# Patient Record
Sex: FEMALE | Race: WHITE | NOT HISPANIC OR LATINO
[De-identification: names, ages, dates, MRNs, and addresses within clinical notes are randomized per-mention and may not be internally consistent; named-entity substitution may affect disease eponyms.]

---

## 2017-04-03 ENCOUNTER — RX RENEWAL (OUTPATIENT)
Age: 67
End: 2017-04-03

## 2017-04-03 ENCOUNTER — APPOINTMENT (OUTPATIENT)
Dept: OBGYN | Facility: CLINIC | Age: 67
End: 2017-04-03

## 2017-04-03 VITALS
HEIGHT: 66 IN | WEIGHT: 211.13 LBS | SYSTOLIC BLOOD PRESSURE: 160 MMHG | DIASTOLIC BLOOD PRESSURE: 80 MMHG | BODY MASS INDEX: 33.93 KG/M2

## 2017-04-03 LAB
DATE COLLECTED: NORMAL
HEMOCCULT SP1 STL QL: NEGATIVE
QUALITY CONTROL: YES

## 2017-04-12 LAB — CYTOLOGY CVX/VAG DOC THIN PREP: NORMAL

## 2018-04-04 ENCOUNTER — APPOINTMENT (OUTPATIENT)
Dept: OBGYN | Facility: CLINIC | Age: 68
End: 2018-04-04
Payer: MEDICARE

## 2018-04-04 VITALS
WEIGHT: 215 LBS | BODY MASS INDEX: 33.35 KG/M2 | HEIGHT: 67.5 IN | DIASTOLIC BLOOD PRESSURE: 79 MMHG | SYSTOLIC BLOOD PRESSURE: 128 MMHG

## 2018-04-04 DIAGNOSIS — N76.2 ACUTE VULVITIS: ICD-10-CM

## 2018-04-04 LAB
DATE COLLECTED: NORMAL
HEMOCCULT SP1 STL QL: NEGATIVE
QUALITY CONTROL: YES

## 2018-04-04 PROCEDURE — 99214 OFFICE O/P EST MOD 30 MIN: CPT

## 2018-04-04 PROCEDURE — 82270 OCCULT BLOOD FECES: CPT

## 2018-04-09 LAB
CYTOLOGY CVX/VAG DOC THIN PREP: NORMAL
HPV HIGH+LOW RISK DNA PNL CVX: NOT DETECTED

## 2019-04-09 ENCOUNTER — APPOINTMENT (OUTPATIENT)
Dept: OBGYN | Facility: CLINIC | Age: 69
End: 2019-04-09
Payer: MEDICARE

## 2019-04-09 VITALS
DIASTOLIC BLOOD PRESSURE: 80 MMHG | WEIGHT: 211 LBS | HEIGHT: 67 IN | BODY MASS INDEX: 33.12 KG/M2 | SYSTOLIC BLOOD PRESSURE: 146 MMHG

## 2019-04-09 DIAGNOSIS — Z85.828 PERSONAL HISTORY OF OTHER MALIGNANT NEOPLASM OF SKIN: ICD-10-CM

## 2019-04-09 LAB
DATE COLLECTED: NORMAL
HEMOCCULT SP1 STL QL: NEGATIVE
QUALITY CONTROL: YES

## 2019-04-09 PROCEDURE — 82270 OCCULT BLOOD FECES: CPT

## 2019-04-09 PROCEDURE — G0101: CPT

## 2019-04-09 NOTE — REVIEW OF SYSTEMS
[Sight Problems] : sight problems [SOB on Exertion] : shortness of breath during exertion [Nl] : Integumentary

## 2019-04-09 NOTE — PHYSICAL EXAM
[Awake] : awake [Alert] : alert [Soft] : soft [Oriented x3] : oriented to person, place, and time [Normal] : urethra [Labia Majora] : labia major [Labia Minora] : labia minora [Atrophy] : atrophy [No Bleeding] : there was no active vaginal bleeding [Absent] : absent [Adnexa Absent] : absent bilaterally [No Tenderness] : no rectal tenderness [Acute Distress] : no acute distress [Thyroid Nodule] : no thyroid nodule [LAD] : no lymphadenopathy [Goiter] : no goiter [Mass] : no breast mass [Axillary LAD] : no axillary lymphadenopathy [Nipple Discharge] : no nipple discharge [Tender] : non tender [Distended] : not distended [H/Smegaly] : no hepatosplenomegaly [Depressed Mood] : not depressed [Adnexa Tenderness] : were not tender [Flat Affect] : affect not flat [Ovarian Mass (___ Cm)] : there were no adnexal masses [Occult Blood] : occult blood test from digital rectal exam was negative [de-identified] : breast exam: supine and upright    abd. hernia

## 2019-04-22 LAB
CYTOLOGY CVX/VAG DOC THIN PREP: NORMAL
HPV HIGH+LOW RISK DNA PNL CVX: NOT DETECTED

## 2020-04-15 ENCOUNTER — APPOINTMENT (OUTPATIENT)
Dept: OBGYN | Facility: CLINIC | Age: 70
End: 2020-04-15

## 2020-06-11 ENCOUNTER — APPOINTMENT (OUTPATIENT)
Dept: OBGYN | Facility: CLINIC | Age: 70
End: 2020-06-11
Payer: MEDICARE

## 2020-06-11 VITALS
HEIGHT: 67 IN | DIASTOLIC BLOOD PRESSURE: 70 MMHG | BODY MASS INDEX: 33.59 KG/M2 | SYSTOLIC BLOOD PRESSURE: 130 MMHG | WEIGHT: 214 LBS

## 2020-06-11 LAB
DATE COLLECTED: NORMAL
HEMOCCULT SP1 STL QL: NEGATIVE
QUALITY CONTROL: YES

## 2020-06-11 PROCEDURE — 82270 OCCULT BLOOD FECES: CPT

## 2020-06-11 PROCEDURE — 99214 OFFICE O/P EST MOD 30 MIN: CPT

## 2020-06-11 NOTE — PHYSICAL EXAM
[Awake] : awake [Alert] : alert [Soft] : soft [Oriented x3] : oriented to person, place, and time [Labia Majora] : labia major [Normal] : urethra [Labia Minora] : labia minora [Atrophy] : atrophy [No Bleeding] : there was no active vaginal bleeding [Absent] : was absent [Adnexa Absent] : absent bilaterally [No Tenderness] : no rectal tenderness [Acute Distress] : no acute distress [LAD] : no lymphadenopathy [Thyroid Nodule] : no thyroid nodule [Goiter] : no goiter [Mass] : no breast mass [Nipple Discharge] : no nipple discharge [Axillary LAD] : no axillary lymphadenopathy [Tender] : non tender [Distended] : not distended [H/Smegaly] : no hepatosplenomegaly [Adnexa Tenderness] : were not tender [Flat Affect] : affect not flat [Depressed Mood] : not depressed [Occult Blood] : occult blood test from digital rectal exam was negative [Ovarian Mass (___ Cm)] : there were no adnexal masses [de-identified] : breast exam: supine and upright    abd. hernia [FreeTextEntry4] : vulvitis in upper labia  possible lichen sclerosis

## 2020-06-11 NOTE — REVIEW OF SYSTEMS
[SOB on Exertion] : shortness of breath during exertion [Sight Problems] : sight problems [Nl] : Integumentary

## 2020-06-12 LAB
CANDIDA VAG CYTO: NOT DETECTED
G VAGINALIS+PREV SP MTYP VAG QL MICRO: NOT DETECTED
T VAGINALIS VAG QL WET PREP: NOT DETECTED

## 2020-11-03 ENCOUNTER — APPOINTMENT (OUTPATIENT)
Dept: OBGYN | Facility: CLINIC | Age: 70
End: 2020-11-03
Payer: MEDICARE

## 2020-11-03 VITALS — HEIGHT: 67 IN | SYSTOLIC BLOOD PRESSURE: 140 MMHG | DIASTOLIC BLOOD PRESSURE: 70 MMHG

## 2020-11-03 DIAGNOSIS — N76.3 SUBACUTE AND CHRONIC VULVITIS: ICD-10-CM

## 2020-11-03 PROCEDURE — 99214 OFFICE O/P EST MOD 30 MIN: CPT

## 2020-11-03 RX ORDER — CHLORTHALIDONE 25 MG/1
25 TABLET ORAL
Qty: 90 | Refills: 0 | Status: ACTIVE | COMMUNITY
Start: 2020-05-04

## 2020-11-03 RX ORDER — INSULIN DEGLUDEC INJECTION 100 U/ML
100 INJECTION, SOLUTION SUBCUTANEOUS
Qty: 45 | Refills: 0 | Status: ACTIVE | COMMUNITY
Start: 2020-08-25

## 2020-11-03 RX ORDER — CLONIDINE HYDROCHLORIDE 0.1 MG/1
0.1 TABLET ORAL
Qty: 90 | Refills: 0 | Status: ACTIVE | COMMUNITY
Start: 2020-05-26

## 2020-11-03 RX ORDER — DEXLANSOPRAZOLE 60 MG/1
60 CAPSULE, DELAYED RELEASE ORAL
Qty: 90 | Refills: 0 | Status: ACTIVE | COMMUNITY
Start: 2020-05-22

## 2020-11-03 NOTE — PHYSICAL EXAM
[Awake] : awake [Alert] : alert [Acute Distress] : no acute distress [LAD] : no lymphadenopathy [Thyroid Nodule] : no thyroid nodule [Goiter] : no goiter [Mass] : no breast mass [Nipple Discharge] : no nipple discharge [Axillary LAD] : no axillary lymphadenopathy [Soft] : soft [Tender] : non tender [Distended] : not distended [H/Smegaly] : no hepatosplenomegaly [Oriented x3] : oriented to person, place, and time [Depressed Mood] : not depressed [Flat Affect] : affect not flat [Normal] : urethra [Labia Majora] : labia major [Labia Minora] : labia minora [Atrophy] : atrophy [No Bleeding] : there was no active vaginal bleeding [Absent] : absent [Adnexa Absent] : absent bilaterally [Adnexa Tenderness] : were not tender [Ovarian Mass (___ Cm)] : there were no adnexal masses [No Tenderness] : no rectal tenderness [Occult Blood] : occult blood test from digital rectal exam was negative [de-identified] : breast exam: supine and upright    abd. hernia [Vulvar Atrophy] : vulvar atrophy

## 2021-02-17 ENCOUNTER — APPOINTMENT (OUTPATIENT)
Dept: OBGYN | Facility: CLINIC | Age: 71
End: 2021-02-17
Payer: MEDICARE

## 2021-02-17 VITALS
DIASTOLIC BLOOD PRESSURE: 68 MMHG | WEIGHT: 209 LBS | SYSTOLIC BLOOD PRESSURE: 138 MMHG | HEIGHT: 67 IN | BODY MASS INDEX: 32.8 KG/M2

## 2021-02-17 DIAGNOSIS — N39.0 URINARY TRACT INFECTION, SITE NOT SPECIFIED: ICD-10-CM

## 2021-02-17 PROCEDURE — 99214 OFFICE O/P EST MOD 30 MIN: CPT

## 2021-02-17 RX ORDER — IODOQUINOL, HYDROCORTISONE ACETATE AND ALOE VERA LEAF 10; 20; 10 MG/G; MG/G; MG/G
1-2-1 GEL TOPICAL 3 TIMES DAILY
Qty: 2 | Refills: 0 | Status: DISCONTINUED | COMMUNITY
Start: 2017-04-03 | End: 2021-02-17

## 2021-02-17 RX ORDER — CALCIFEDIOL 30 UG/1
30 CAPSULE, EXTENDED RELEASE ORAL
Refills: 0 | Status: ACTIVE | COMMUNITY

## 2021-02-22 LAB
APPEARANCE: CLEAR
BACTERIA: NEGATIVE
BILIRUBIN URINE: NEGATIVE
BLOOD URINE: NEGATIVE
COLOR: NORMAL
GLUCOSE QUALITATIVE U: ABNORMAL
HYALINE CASTS: 0 /LPF
KETONES URINE: NEGATIVE
LEUKOCYTE ESTERASE URINE: ABNORMAL
MICROSCOPIC-UA: NORMAL
NITRITE URINE: NEGATIVE
PH URINE: 6.5
PROTEIN URINE: ABNORMAL
RED BLOOD CELLS URINE: 1 /HPF
SPECIFIC GRAVITY URINE: 1.01
SQUAMOUS EPITHELIAL CELLS: 2 /HPF
UROBILINOGEN URINE: NORMAL
WHITE BLOOD CELLS URINE: 11 /HPF

## 2021-06-21 ENCOUNTER — APPOINTMENT (OUTPATIENT)
Dept: OBGYN | Facility: CLINIC | Age: 71
End: 2021-06-21
Payer: MEDICARE

## 2021-06-21 ENCOUNTER — RESULT CHARGE (OUTPATIENT)
Age: 71
End: 2021-06-21

## 2021-06-21 VITALS
WEIGHT: 210 LBS | DIASTOLIC BLOOD PRESSURE: 78 MMHG | SYSTOLIC BLOOD PRESSURE: 140 MMHG | HEIGHT: 67 IN | BODY MASS INDEX: 32.96 KG/M2 | TEMPERATURE: 97.8 F

## 2021-06-21 LAB
DATE COLLECTED: NORMAL
HEMOCCULT SP1 STL QL: NEGATIVE
QUALITY CONTROL: YES

## 2021-06-21 PROCEDURE — G0101: CPT

## 2021-06-21 PROCEDURE — 82270 OCCULT BLOOD FECES: CPT

## 2021-06-21 NOTE — PHYSICAL EXAM
[Awake] : awake [Alert] : alert [Soft] : soft [Oriented x3] : oriented to person, place, and time [Normal] : urethra [Labia Majora] : labia major [Labia Minora] : labia minora [Atrophy] : atrophy [No Bleeding] : there was no active vaginal bleeding [Absent] : absent [Adnexa Absent] : absent bilaterally [No Tenderness] : no rectal tenderness [Acute Distress] : no acute distress [LAD] : no lymphadenopathy [Thyroid Nodule] : no thyroid nodule [Goiter] : no goiter [Mass] : no breast mass [Nipple Discharge] : no nipple discharge [Axillary LAD] : no axillary lymphadenopathy [Tender] : non tender [Distended] : not distended [H/Smegaly] : no hepatosplenomegaly [Depressed Mood] : not depressed [Flat Affect] : affect not flat [Adnexa Tenderness] : were not tender [Ovarian Mass (___ Cm)] : there were no adnexal masses [Occult Blood] : occult blood test from digital rectal exam was negative [de-identified] : breast exam: supine and upright    abd. hernia

## 2022-03-18 ENCOUNTER — APPOINTMENT (OUTPATIENT)
Dept: CARDIOLOGY | Facility: CLINIC | Age: 72
End: 2022-03-18
Payer: MEDICARE

## 2022-03-18 ENCOUNTER — NON-APPOINTMENT (OUTPATIENT)
Age: 72
End: 2022-03-18

## 2022-03-18 VITALS — HEIGHT: 67 IN | HEART RATE: 75 BPM | BODY MASS INDEX: 32.96 KG/M2 | WEIGHT: 210 LBS | OXYGEN SATURATION: 98 %

## 2022-03-18 VITALS — SYSTOLIC BLOOD PRESSURE: 142 MMHG | DIASTOLIC BLOOD PRESSURE: 82 MMHG

## 2022-03-18 DIAGNOSIS — Z79.899 OTHER LONG TERM (CURRENT) DRUG THERAPY: ICD-10-CM

## 2022-03-18 DIAGNOSIS — Z79.4 TYPE 2 DIABETES MELLITUS W/OUT COMPLICATIONS: ICD-10-CM

## 2022-03-18 DIAGNOSIS — Z13.6 ENCOUNTER FOR SCREENING FOR CARDIOVASCULAR DISORDERS: ICD-10-CM

## 2022-03-18 DIAGNOSIS — E11.9 TYPE 2 DIABETES MELLITUS W/OUT COMPLICATIONS: ICD-10-CM

## 2022-03-18 DIAGNOSIS — Z82.49 FAMILY HISTORY OF ISCHEMIC HEART DISEASE AND OTHER DISEASES OF THE CIRCULATORY SYSTEM: ICD-10-CM

## 2022-03-18 DIAGNOSIS — I10 ESSENTIAL (PRIMARY) HYPERTENSION: ICD-10-CM

## 2022-03-18 DIAGNOSIS — N28.9 DISORDER OF KIDNEY AND URETER, UNSPECIFIED: ICD-10-CM

## 2022-03-18 DIAGNOSIS — E78.5 HYPERLIPIDEMIA, UNSPECIFIED: ICD-10-CM

## 2022-03-18 PROCEDURE — 93000 ELECTROCARDIOGRAM COMPLETE: CPT

## 2022-03-18 PROCEDURE — 99204 OFFICE O/P NEW MOD 45 MIN: CPT | Mod: 25

## 2022-03-18 RX ORDER — NITROFURANTOIN (MONOHYDRATE/MACROCRYSTALS) 25; 75 MG/1; MG/1
100 CAPSULE ORAL
Qty: 14 | Refills: 0 | Status: DISCONTINUED | COMMUNITY
Start: 2021-02-22 | End: 2022-03-18

## 2022-03-18 RX ORDER — NYSTATIN AND TRIAMCINOLONE ACETONIDE 100000; 1 MG/G; MG/G
100000-0.1 CREAM TOPICAL TWICE DAILY
Qty: 1 | Refills: 5 | Status: DISCONTINUED | COMMUNITY
Start: 2018-04-04 | End: 2022-03-18

## 2022-03-18 RX ORDER — MAGNESIUM CHLORIDE 71.5 MG
71.5-119 TABLET, DELAYED RELEASE (ENTERIC COATED) ORAL
Refills: 0 | Status: ACTIVE | COMMUNITY
Start: 2022-03-18

## 2022-03-18 RX ORDER — CLOBETASOL PROPIONATE 0.5 MG/G
0.05 CREAM TOPICAL TWICE DAILY
Qty: 1 | Refills: 5 | Status: DISCONTINUED | COMMUNITY
Start: 2020-06-11 | End: 2022-03-18

## 2022-03-18 RX ORDER — ESTRADIOL 0.1 MG/G
0.1 CREAM VAGINAL
Qty: 60 | Refills: 3 | Status: DISCONTINUED | COMMUNITY
Start: 2020-11-03 | End: 2022-03-18

## 2022-03-18 NOTE — HISTORY OF PRESENT ILLNESS
[FreeTextEntry1] : 71 year old female with PMhx of DM, HTN, HLD, CKD presents for a cardiac evaluation. Last seen in our office >3 years ago. Patient has no chest pain, SOB, or palpitations. Compliant with medical therapy and reports no adverse effects.\par \par There is no history of MI, CVA, CHF, or previous coronary intervention.\par

## 2022-03-18 NOTE — PHYSICAL EXAM
[Normal] : moves all extremities, no focal deficits, normal speech [de-identified] : No carotid bruits auscultated bilaterally

## 2022-03-18 NOTE — REVIEW OF SYSTEMS
[Fever] : no fever [Chills] : no chills [Feeling Fatigued] : feeling fatigued [Joint Pain] : joint pain [Joint Stiffness] : joint stiffness [Negative] : Neurological [FreeTextEntry5] : see HPI

## 2022-03-18 NOTE — DISCUSSION/SUMMARY
[FreeTextEntry1] : 1. HTN: goal BP less than 130/80. Low salt diet recommended. Continue Coreg 12.5mg BID (high risk medication with no signs of toxicity), chlorthalidone 25mg daily, clonidine 0.1mg. Recommend echocardiogram to evaluate for hypertensive heart disease. \par \par 2. HLD: continue atorvastatin 10mg daily. \par \par Office will call with results. \par \par Follow up in 1 year.

## 2022-03-29 ENCOUNTER — APPOINTMENT (OUTPATIENT)
Dept: CARDIOLOGY | Facility: CLINIC | Age: 72
End: 2022-03-29
Payer: MEDICARE

## 2022-03-29 PROCEDURE — 93306 TTE W/DOPPLER COMPLETE: CPT

## 2022-03-30 ENCOUNTER — NON-APPOINTMENT (OUTPATIENT)
Age: 72
End: 2022-03-30

## 2022-04-18 ENCOUNTER — NON-APPOINTMENT (OUTPATIENT)
Age: 72
End: 2022-04-18

## 2022-06-23 ENCOUNTER — APPOINTMENT (OUTPATIENT)
Dept: OBGYN | Facility: CLINIC | Age: 72
End: 2022-06-23

## 2023-02-23 ENCOUNTER — OFFICE (OUTPATIENT)
Dept: URBAN - METROPOLITAN AREA CLINIC 38 | Facility: CLINIC | Age: 73
Setting detail: OPHTHALMOLOGY
End: 2023-02-23
Payer: MEDICARE

## 2023-02-23 DIAGNOSIS — H25.13: ICD-10-CM

## 2023-02-23 DIAGNOSIS — H35.373: ICD-10-CM

## 2023-02-23 DIAGNOSIS — H53.002: ICD-10-CM

## 2023-02-23 DIAGNOSIS — H02.831: ICD-10-CM

## 2023-02-23 DIAGNOSIS — H02.832: ICD-10-CM

## 2023-02-23 DIAGNOSIS — E11.3313: ICD-10-CM

## 2023-02-23 DIAGNOSIS — H43.813: ICD-10-CM

## 2023-02-23 DIAGNOSIS — H02.835: ICD-10-CM

## 2023-02-23 DIAGNOSIS — H02.89: ICD-10-CM

## 2023-02-23 DIAGNOSIS — H02.834: ICD-10-CM

## 2023-02-23 PROCEDURE — 92014 COMPRE OPH EXAM EST PT 1/>: CPT | Performed by: OPHTHALMOLOGY

## 2023-02-23 PROCEDURE — 92134 CPTRZ OPH DX IMG PST SGM RTA: CPT | Performed by: OPHTHALMOLOGY

## 2023-02-23 ASSESSMENT — KERATOMETRY
OS_K1POWER_DIOPTERS: 41.75
OD_AXISANGLE_DEGREES: 030
OS_K2POWER_DIOPTERS: 45.50
OD_K1POWER_DIOPTERS: 42.25
METHOD_AUTO_MANUAL: AUTO
OS_AXISANGLE_DEGREES: 144
OD_K2POWER_DIOPTERS: 43.50

## 2023-02-23 ASSESSMENT — REFRACTION_AUTOREFRACTION
OD_AXIS: 112
OD_SPHERE: +4.00
OS_AXIS: 052
OS_CYLINDER: -4.50
OD_CYLINDER: -1.50
OS_SPHERE: +8.00

## 2023-02-23 ASSESSMENT — SPHEQUIV_DERIVED
OD_SPHEQUIV: 2.75
OD_SPHEQUIV: 2.75
OS_SPHEQUIV: 5.75
OD_SPHEQUIV: 3.25

## 2023-02-23 ASSESSMENT — REFRACTION_CURRENTRX
OS_OVR_VA: 20/
OS_VPRISM_DIRECTION: PROGS
OD_CYLINDER: -1.50
OS_OVR_VA: 20/
OS_CYLINDER: SPHERE
OS_ADD: +2.50
OS_SPHERE: +2.50
OD_ADD: +3.00
OS_CYLINDER: SPHERE
OD_CYLINDER: -1.75
OS_SPHERE: +2.50
OD_VPRISM_DIRECTION: PROGS
OD_ADD: +2.50
OD_SPHERE: +3.25
OD_OVR_VA: 20/
OS_ADD: +3.00
OD_AXIS: 095
OD_SPHERE: +3.00
OS_VPRISM_DIRECTION: PROGS
OD_OVR_VA: 20/
OD_AXIS: 110
OD_VPRISM_DIRECTION: PROGS

## 2023-02-23 ASSESSMENT — REFRACTION_MANIFEST
OD_CYLINDER: -1.50
OD_AXIS: 110
OD_SPHERE: +3.50
OS_VA2: 20/30(J2)
OS_ADD: +3.00
OS_VA1: 20/150
OS_SPHERE: +2.75
OD_VA2: 20/30(J2)
OD_SPHERE: +3.50
OD_ADD: +3.00
OD_AXIS: 110
OD_ADD: +3.25
OS_ADD: +3.25
OD_VA1: 20/30-2
OD_VA1: 20/30-2
OS_CYLINDER: SPH
OS_VA1: 20/150
OU_VA: 20/30-
OS_SPHERE: +2.75
OD_CYLINDER: -1.50
OU_VA: 20/30-
OS_CYLINDER: SPH
OD_VA2: 20/30(J2)
OS_VA2: 20/30(J2)

## 2023-02-23 ASSESSMENT — AXIALLENGTH_DERIVED
OD_AL: 22.5989
OD_AL: 22.7791
OS_AL: 21.5077
OD_AL: 22.7791

## 2023-02-23 ASSESSMENT — LID POSITION - DERMATOCHALASIS
OD_DERMATOCHALASIS: RLL RUL
OS_DERMATOCHALASIS: LLL LUL

## 2023-02-23 ASSESSMENT — VISUAL ACUITY
OS_BCVA: 20/60-1
OD_BCVA: 20/200

## 2023-02-23 ASSESSMENT — CONFRONTATIONAL VISUAL FIELD TEST (CVF)
OS_FINDINGS: FULL
OD_FINDINGS: FULL

## 2024-04-11 ENCOUNTER — RX ONLY (RX ONLY)
Age: 74
End: 2024-04-11

## 2024-04-11 ENCOUNTER — OFFICE (OUTPATIENT)
Dept: URBAN - METROPOLITAN AREA CLINIC 38 | Facility: CLINIC | Age: 74
Setting detail: OPHTHALMOLOGY
End: 2024-04-11
Payer: MEDICARE

## 2024-04-11 DIAGNOSIS — H02.832: ICD-10-CM

## 2024-04-11 DIAGNOSIS — H53.002: ICD-10-CM

## 2024-04-11 DIAGNOSIS — H02.831: ICD-10-CM

## 2024-04-11 DIAGNOSIS — H02.89: ICD-10-CM

## 2024-04-11 DIAGNOSIS — H43.813: ICD-10-CM

## 2024-04-11 DIAGNOSIS — H35.373: ICD-10-CM

## 2024-04-11 DIAGNOSIS — H02.834: ICD-10-CM

## 2024-04-11 DIAGNOSIS — H16.223: ICD-10-CM

## 2024-04-11 DIAGNOSIS — H25.13: ICD-10-CM

## 2024-04-11 DIAGNOSIS — H02.835: ICD-10-CM

## 2024-04-11 DIAGNOSIS — E11.3313: ICD-10-CM

## 2024-04-11 PROCEDURE — 92014 COMPRE OPH EXAM EST PT 1/>: CPT | Performed by: OPHTHALMOLOGY

## 2024-04-11 PROCEDURE — 92015 DETERMINE REFRACTIVE STATE: CPT | Performed by: OPHTHALMOLOGY

## 2024-04-11 PROCEDURE — 92134 CPTRZ OPH DX IMG PST SGM RTA: CPT | Performed by: OPHTHALMOLOGY

## 2024-04-11 ASSESSMENT — LID POSITION - DERMATOCHALASIS
OD_DERMATOCHALASIS: RLL RUL
OS_DERMATOCHALASIS: LLL LUL

## 2024-11-14 ENCOUNTER — OFFICE (OUTPATIENT)
Dept: URBAN - METROPOLITAN AREA CLINIC 38 | Facility: CLINIC | Age: 74
Setting detail: OPHTHALMOLOGY
End: 2024-11-14
Payer: MEDICARE

## 2024-11-14 DIAGNOSIS — H02.89: ICD-10-CM

## 2024-11-14 DIAGNOSIS — H02.834: ICD-10-CM

## 2024-11-14 DIAGNOSIS — H02.831: ICD-10-CM

## 2024-11-14 DIAGNOSIS — H25.13: ICD-10-CM

## 2024-11-14 DIAGNOSIS — E11.3313: ICD-10-CM

## 2024-11-14 DIAGNOSIS — H53.002: ICD-10-CM

## 2024-11-14 DIAGNOSIS — H35.373: ICD-10-CM

## 2024-11-14 DIAGNOSIS — H43.813: ICD-10-CM

## 2024-11-14 DIAGNOSIS — H02.835: ICD-10-CM

## 2024-11-14 DIAGNOSIS — H02.832: ICD-10-CM

## 2024-11-14 DIAGNOSIS — H16.223: ICD-10-CM

## 2024-11-14 PROCEDURE — 99214 OFFICE O/P EST MOD 30 MIN: CPT | Performed by: OPHTHALMOLOGY

## 2024-11-14 ASSESSMENT — REFRACTION_MANIFEST
OD_SPHERE: +3.00
OS_CYLINDER: SPH
OD_CYLINDER: -1.50
OD_AXIS: 105
OS_SPHERE: +2.50
OD_ADD: +3.25
OS_ADD: +3.25
OS_SPHERE: +2.50
OS_ADD: +3.25
OD_SPHERE: +3.00
OS_VA1: 20/150+
OD_AXIS: 105
OD_CYLINDER: -1.50
OU_VA: 20/150
OS_VA2: 20/60(J6)
OD_ADD: +3.25
OD_VA2: 20/60(J6)
OD_VA1: 20/200
OS_VA2: 20/60(J6)
OS_CYLINDER: SPH
OD_VA2: 20/60(J6)

## 2024-11-14 ASSESSMENT — REFRACTION_CURRENTRX
OD_ADD: +3.00
OS_CYLINDER: SPHERE
OS_OVR_VA: 20/
OD_AXIS: 095
OS_ADD: +3.00
OS_VPRISM_DIRECTION: PROGS
OS_OVR_VA: 20/
OS_SPHERE: +2.50
OD_ADD: +2.50
OD_AXIS: 103
OD_CYLINDER: -1.50
OS_SPHERE: +2.50
OS_VPRISM_DIRECTION: PROGS
OS_CYLINDER: SPHERE
OD_CYLINDER: -1.75
OS_ADD: +2.50
OD_SPHERE: +3.25
OD_VPRISM_DIRECTION: PROGS
OD_OVR_VA: 20/
OD_SPHERE: +3.00
OD_OVR_VA: 20/
OD_VPRISM_DIRECTION: PROGS

## 2024-11-14 ASSESSMENT — KERATOMETRY
OD_K2POWER_DIOPTERS: 43.50
METHOD_AUTO_MANUAL: AUTO
OS_AXISANGLE_DEGREES: 142
OD_AXISANGLE_DEGREES: 026
OS_K1POWER_DIOPTERS: 42.00
OD_K1POWER_DIOPTERS: 42.00
OS_K2POWER_DIOPTERS: 45.25

## 2024-11-14 ASSESSMENT — REFRACTION_AUTOREFRACTION
OS_SPHERE: +8.50
OS_CYLINDER: -4.25
OS_AXIS: -5.25

## 2024-11-14 ASSESSMENT — LID POSITION - DERMATOCHALASIS
OS_DERMATOCHALASIS: LLL LUL
OD_DERMATOCHALASIS: RLL RUL

## 2024-11-14 ASSESSMENT — CONFRONTATIONAL VISUAL FIELD TEST (CVF)
OD_FINDINGS: FULL
OS_FINDINGS: FULL

## 2024-11-14 ASSESSMENT — VISUAL ACUITY
OD_BCVA: 20/150+
OS_BCVA: 20/200

## 2024-11-14 ASSESSMENT — SUPERFICIAL PUNCTATE KERATITIS (SPK)
OS_SPK: T
OD_SPK: T

## 2025-01-13 ENCOUNTER — OFFICE (OUTPATIENT)
Dept: URBAN - METROPOLITAN AREA CLINIC 8 | Facility: CLINIC | Age: 75
Setting detail: OPHTHALMOLOGY
End: 2025-01-13
Payer: MEDICARE

## 2025-01-13 DIAGNOSIS — H25.13: ICD-10-CM

## 2025-01-13 DIAGNOSIS — H25.11: ICD-10-CM

## 2025-01-13 PROCEDURE — 92136 OPHTHALMIC BIOMETRY: CPT | Mod: TC | Performed by: OPHTHALMOLOGY

## 2025-01-13 PROCEDURE — 99213 OFFICE O/P EST LOW 20 MIN: CPT | Performed by: OPHTHALMOLOGY

## 2025-01-13 PROCEDURE — 92136 OPHTHALMIC BIOMETRY: CPT | Mod: 26,RT | Performed by: OPHTHALMOLOGY

## 2025-01-13 ASSESSMENT — REFRACTION_CURRENTRX
OD_VPRISM_DIRECTION: PROGS
OD_ADD: +3.00
OS_ADD: +3.00
OS_VPRISM_DIRECTION: PROGS
OD_OVR_VA: 20/
OS_OVR_VA: 20/
OD_OVR_VA: 20/
OS_CYLINDER: SPHERE
OS_ADD: +2.50
OS_OVR_VA: 20/
OS_VPRISM_DIRECTION: PROGS
OD_VPRISM_DIRECTION: PROGS
OS_CYLINDER: SPHERE
OD_CYLINDER: -1.75
OD_CYLINDER: -1.50
OS_SPHERE: +2.50
OD_AXIS: 103
OS_SPHERE: +2.50
OD_SPHERE: +3.00
OD_AXIS: 095
OD_SPHERE: +3.25
OD_ADD: +2.50

## 2025-01-13 ASSESSMENT — REFRACTION_AUTOREFRACTION
OS_AXIS: 054
OS_SPHERE: +7.50
OS_CYLINDER: -4.75

## 2025-01-13 ASSESSMENT — CONFRONTATIONAL VISUAL FIELD TEST (CVF)
OS_FINDINGS: FULL
OD_FINDINGS: FULL

## 2025-01-13 ASSESSMENT — REFRACTION_MANIFEST
OS_ADD: +3.25
OS_CYLINDER: SPH
OD_AXIS: 105
OD_ADD: +3.25
OS_VA2: 20/60(J6)
OS_SPHERE: +2.50
OD_AXIS: 105
OS_VA1: 20/150+
OD_VA1: 20/200
OS_SPHERE: +2.50
OS_VA2: 20/60(J6)
OU_VA: 20/150
OD_VA2: 20/60(J6)
OD_SPHERE: +3.00
OD_VA2: 20/60(J6)
OD_SPHERE: +3.00
OD_CYLINDER: -1.50
OS_ADD: +3.25
OD_ADD: +3.25
OS_CYLINDER: SPH
OD_CYLINDER: -1.50

## 2025-01-13 ASSESSMENT — SUPERFICIAL PUNCTATE KERATITIS (SPK)
OS_SPK: T
OD_SPK: T

## 2025-01-13 ASSESSMENT — VISUAL ACUITY
OD_BCVA: 20/150+
OS_BCVA: 20/200

## 2025-01-13 ASSESSMENT — KERATOMETRY
OD_K2POWER_DIOPTERS: 43.25
OS_K2POWER_DIOPTERS: 45.00
METHOD_AUTO_MANUAL: AUTO
OS_AXISANGLE_DEGREES: 139
OD_K1POWER_DIOPTERS: 42.50
OD_AXISANGLE_DEGREES: 024
OS_K1POWER_DIOPTERS: 41.75

## 2025-01-13 ASSESSMENT — LID POSITION - DERMATOCHALASIS
OS_DERMATOCHALASIS: LLL LUL
OD_DERMATOCHALASIS: RLL RUL

## 2025-01-20 ENCOUNTER — AMBULATORY SURGERY CENTER (OUTPATIENT)
Dept: URBAN - METROPOLITAN AREA SURGERY 4 | Facility: SURGERY | Age: 75
Setting detail: OPHTHALMOLOGY
End: 2025-01-20
Payer: MEDICARE

## 2025-01-20 DIAGNOSIS — H52.221: ICD-10-CM

## 2025-01-20 DIAGNOSIS — H25.11: ICD-10-CM

## 2025-01-20 DIAGNOSIS — H21.561: ICD-10-CM

## 2025-01-20 PROCEDURE — 66982 XCAPSL CTRC RMVL CPLX WO ECP: CPT | Mod: RT | Performed by: OPHTHALMOLOGY

## 2025-01-20 PROCEDURE — S9986 NOT MEDICALLY NECESSARY SVC: HCPCS | Mod: GX,GY | Performed by: OPHTHALMOLOGY

## 2025-01-20 PROCEDURE — FEMTO PRECISION LASER CATARACT SURGERY: Mod: GY | Performed by: OPHTHALMOLOGY

## 2025-01-20 PROCEDURE — V2787 ASTIGMATISM-CORRECT FUNCTION: HCPCS | Performed by: OPHTHALMOLOGY

## 2025-01-21 ENCOUNTER — RX ONLY (RX ONLY)
Age: 75
End: 2025-01-21

## 2025-01-21 ENCOUNTER — OFFICE (OUTPATIENT)
Dept: URBAN - METROPOLITAN AREA CLINIC 38 | Facility: CLINIC | Age: 75
Setting detail: OPHTHALMOLOGY
End: 2025-01-21
Payer: MEDICARE

## 2025-01-21 DIAGNOSIS — H11.31: ICD-10-CM

## 2025-01-21 DIAGNOSIS — H25.11: ICD-10-CM

## 2025-01-21 PROBLEM — H16.221 DRY EYE SYNDROME K SICCA;  , RIGHT EYE: Status: ACTIVE | Noted: 2025-01-21

## 2025-01-21 PROBLEM — H25.12 CATARACT; LEFT EYE: Status: ACTIVE | Noted: 2025-01-21

## 2025-01-21 PROCEDURE — 99024 POSTOP FOLLOW-UP VISIT: CPT | Performed by: OPHTHALMOLOGY

## 2025-01-21 ASSESSMENT — REFRACTION_CURRENTRX
OS_VPRISM_DIRECTION: PROGS
OD_VPRISM_DIRECTION: PROGS
OS_CYLINDER: SPHERE
OD_SPHERE: +3.25
OD_OVR_VA: 20/
OD_VPRISM_DIRECTION: PROGS
OS_CYLINDER: SPHERE
OS_OVR_VA: 20/
OS_ADD: +3.00
OS_SPHERE: +2.50
OD_CYLINDER: -1.50
OS_OVR_VA: 20/
OD_ADD: +2.50
OD_SPHERE: +3.00
OD_AXIS: 103
OD_CYLINDER: -1.75
OS_SPHERE: +2.50
OS_ADD: +2.50
OS_VPRISM_DIRECTION: PROGS
OD_OVR_VA: 20/
OD_ADD: +3.00
OD_AXIS: 095

## 2025-01-21 ASSESSMENT — VISUAL ACUITY
OS_BCVA: 20/80
OD_BCVA: 20/150+

## 2025-01-21 ASSESSMENT — REFRACTION_MANIFEST
OD_SPHERE: +3.00
OD_ADD: +3.25
OD_VA2: 20/60(J6)
OD_VA1: 20/200
OS_ADD: +3.25
OS_VA1: 20/150+
OS_SPHERE: +2.50
OS_VA2: 20/60(J6)
OU_VA: 20/150
OD_AXIS: 105
OD_SPHERE: +3.00
OD_VA2: 20/60(J6)
OD_ADD: +3.25
OD_CYLINDER: -1.50
OD_CYLINDER: -1.50
OS_VA2: 20/60(J6)
OS_ADD: +3.25
OS_CYLINDER: SPH
OS_CYLINDER: SPH
OS_SPHERE: +2.50
OD_AXIS: 105

## 2025-01-21 ASSESSMENT — REFRACTION_AUTOREFRACTION
OD_SPHERE: -1.75
OD_CYLINDER: -2.50
OS_SPHERE: +8.25
OS_AXIS: 054
OD_AXIS: 035
OS_CYLINDER: -5.50

## 2025-01-21 ASSESSMENT — LID POSITION - DERMATOCHALASIS
OS_DERMATOCHALASIS: LLL LUL
OD_DERMATOCHALASIS: RLL RUL

## 2025-01-21 ASSESSMENT — KERATOMETRY
OS_K1POWER_DIOPTERS: 41.50
OD_K1POWER_DIOPTERS: 41.00
OS_AXISANGLE_DEGREES: 142
OD_AXISANGLE_DEGREES: 020
METHOD_AUTO_MANUAL: AUTO
OD_K2POWER_DIOPTERS: 42.50
OS_K2POWER_DIOPTERS: 45.25

## 2025-01-21 ASSESSMENT — SUPERFICIAL PUNCTATE KERATITIS (SPK)
OD_SPK: T
OS_SPK: T

## 2025-01-21 ASSESSMENT — CONFRONTATIONAL VISUAL FIELD TEST (CVF)
OD_FINDINGS: FULL
OS_FINDINGS: FULL

## 2025-02-20 ENCOUNTER — OFFICE (OUTPATIENT)
Dept: URBAN - METROPOLITAN AREA CLINIC 38 | Facility: CLINIC | Age: 75
Setting detail: OPHTHALMOLOGY
End: 2025-02-20
Payer: MEDICARE

## 2025-02-20 DIAGNOSIS — Z96.1: ICD-10-CM

## 2025-02-20 PROBLEM — H02.831 DERMATOCHALASIS; RIGHT UPPER LID, RIGHT LOWER LID, LEFT UPPER LID, LEFT LOWER LID: Status: ACTIVE | Noted: 2025-02-20

## 2025-02-20 PROBLEM — H16.223 DRY EYE SYNDROME K SICCA;  ,, BOTH EYES: Status: ACTIVE | Noted: 2025-02-20

## 2025-02-20 PROBLEM — H02.832 DERMATOCHALASIS; RIGHT UPPER LID, RIGHT LOWER LID, LEFT UPPER LID, LEFT LOWER LID: Status: ACTIVE | Noted: 2025-02-20

## 2025-02-20 PROBLEM — H02.835 DERMATOCHALASIS; RIGHT UPPER LID, RIGHT LOWER LID, LEFT UPPER LID, LEFT LOWER LID: Status: ACTIVE | Noted: 2025-02-20

## 2025-02-20 PROBLEM — H02.834 DERMATOCHALASIS; RIGHT UPPER LID, RIGHT LOWER LID, LEFT UPPER LID, LEFT LOWER LID: Status: ACTIVE | Noted: 2025-02-20

## 2025-02-20 PROCEDURE — 99024 POSTOP FOLLOW-UP VISIT: CPT | Performed by: OPHTHALMOLOGY

## 2025-02-20 ASSESSMENT — REFRACTION_MANIFEST
OS_SPHERE: +2.50
OS_ADD: +3.00
OS_SPHERE: +7.75
OD_ADD: +3.00
OD_AXIS: 105
OD_SPHERE: +0.25
OS_CYLINDER: SPH
OD_CYLINDER: -0.25
OS_AXIS: 055
OD_VA1: 20/30+
OD_ADD: +3.25
OS_VA2: 20/20(J1+)
OU_VA: 20/30
OD_VA2: 20/60(J6)
OS_CYLINDER: -5.25
OD_VA2: 20/20(J1+)
OS_VA2: 20/60(J6)
OD_SPHERE: +3.00
OS_ADD: +3.25
OD_AXIS: 125
OD_CYLINDER: -1.50

## 2025-02-20 ASSESSMENT — KERATOMETRY
OD_AXISANGLE_DEGREES: 029
OD_K1POWER_DIOPTERS: 42.25
OS_K2POWER_DIOPTERS: 45.00
OS_AXISANGLE_DEGREES: 145
OD_K2POWER_DIOPTERS: 433.50
OS_K1POWER_DIOPTERS: 41.75
METHOD_AUTO_MANUAL: AUTO

## 2025-02-20 ASSESSMENT — REFRACTION_CURRENTRX
OD_ADD: +3.00
OD_AXIS: 095
OD_OVR_VA: 20/
OD_CYLINDER: -1.75
OD_AXIS: 103
OD_VPRISM_DIRECTION: PROGS
OS_CYLINDER: SPHERE
OD_SPHERE: +3.25
OS_ADD: +3.00
OS_CYLINDER: SPHERE
OD_OVR_VA: 20/
OS_VPRISM_DIRECTION: PROGS
OS_OVR_VA: 20/
OS_VPRISM_DIRECTION: PROGS
OS_ADD: +2.50
OS_SPHERE: +2.50
OD_SPHERE: +3.00
OD_CYLINDER: -1.50
OD_ADD: +2.50
OD_VPRISM_DIRECTION: PROGS
OS_SPHERE: +2.50
OS_OVR_VA: 20/

## 2025-02-20 ASSESSMENT — VISUAL ACUITY
OD_BCVA: 20/200
OS_BCVA: 20/30-2

## 2025-02-20 ASSESSMENT — REFRACTION_AUTOREFRACTION
OS_SPHERE: +8.00
OS_CYLINDER: -5.12
OD_SPHERE: +1.00
OD_CYLINDER: -0.75
OD_AXIS: 125
OS_AXIS: 055

## 2025-02-20 ASSESSMENT — SUPERFICIAL PUNCTATE KERATITIS (SPK)
OD_SPK: T
OS_SPK: T

## 2025-02-20 ASSESSMENT — LID POSITION - DERMATOCHALASIS
OD_DERMATOCHALASIS: RLL RUL
OS_DERMATOCHALASIS: LLL LUL

## 2025-02-20 ASSESSMENT — CONFRONTATIONAL VISUAL FIELD TEST (CVF)
OD_FINDINGS: FULL
OS_FINDINGS: FULL